# Patient Record
Sex: FEMALE | Race: BLACK OR AFRICAN AMERICAN
[De-identification: names, ages, dates, MRNs, and addresses within clinical notes are randomized per-mention and may not be internally consistent; named-entity substitution may affect disease eponyms.]

---

## 2018-03-14 ENCOUNTER — HOSPITAL ENCOUNTER (EMERGENCY)
Dept: HOSPITAL 62 - ER | Age: 58
Discharge: HOME | End: 2018-03-14
Payer: MEDICARE

## 2018-03-14 VITALS — DIASTOLIC BLOOD PRESSURE: 82 MMHG | SYSTOLIC BLOOD PRESSURE: 139 MMHG

## 2018-03-14 DIAGNOSIS — S96.911A: Primary | ICD-10-CM

## 2018-03-14 DIAGNOSIS — Z90.710: ICD-10-CM

## 2018-03-14 DIAGNOSIS — Z96.652: ICD-10-CM
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
## 2018-03-14 DIAGNOSIS — S83.91XA: ICD-10-CM

## 2018-03-14 DIAGNOSIS — E03.9: ICD-10-CM

## 2018-03-14 DIAGNOSIS — Z88.6: ICD-10-CM

## 2018-03-14 DIAGNOSIS — E78.00: ICD-10-CM

## 2018-03-14 DIAGNOSIS — I10: ICD-10-CM

## 2018-03-14 PROCEDURE — 99283 EMERGENCY DEPT VISIT LOW MDM: CPT

## 2018-03-14 NOTE — RADIOLOGY REPORT (SQ)
EXAM DESCRIPTION:  FOOT RIGHT COMPLETE



COMPLETED DATE/TIME:  3/14/2018 6:22 pm



REASON FOR STUDY:  PAIN, FALL



COMPARISON:  None.



NUMBER OF VIEWS:  Three views.



TECHNIQUE:  AP, lateral and oblique  radiographic images acquired of the right foot.



LIMITATIONS:  None.



FINDINGS:  MINERALIZATION: Normal.

BONES: No acute fracture or dislocation.  Hallux valgus and bunion deformity.  Calcaneal spurs.  No w
orrisome bone lesions.

JOINTS: No effusions.

SOFT TISSUES: No soft tissue swelling.  No foreign body.

OTHER: No other significant finding.



IMPRESSION:  CHRONIC CHANGES. NO RADIOGRAPHIC EVIDENCE OF ACUTE INJURY.



TECHNICAL DOCUMENTATION:  JOB ID:  [id number]

 2011 [employer]- All Rights Reserved



Reading location - IP/workstation name: [email or web address]

## 2018-03-14 NOTE — RADIOLOGY REPORT (SQ)
EXAM DESCRIPTION:  KNEE RIGHT 2 VIEWS



COMPLETED DATE/TIME:  3/14/2018 6:22 pm



REASON FOR STUDY:  PAIN



COMPARISON:  None.



NUMBER OF VIEWS:  Two views.



TECHNIQUE:  AP, lateral, and both oblique radiographic images acquired of the right knee.



LIMITATIONS:  None.



FINDINGS:  MINERALIZATION: Normal.

BONES: Knee prosthesis.  No acute fracture or dislocation.  No worrisome bone lesions.

JOINT: No effusion.

SOFT TISSUES: No soft tissue swelling.  No radio-opaque foreign body.

OTHER: No other significant finding.



IMPRESSION:  KNEE PROSTHESIS.  NO ACUTE FINDINGS.



TECHNICAL DOCUMENTATION:  JOB ID:  [id number]

 2011 [employer]- All Rights Reserved



Reading location - IP/workstation name: [email or web address]

## 2018-03-14 NOTE — RADIOLOGY REPORT (SQ)
EXAM DESCRIPTION:  ANKLE RIGHT COMPLETE



COMPLETED DATE/TIME:  3/14/2018 6:22 pm



REASON FOR STUDY:  PAIN, FALL



COMPARISON:  None.



NUMBER OF VIEWS:  Three views.



TECHNIQUE:  AP, lateral, and oblique radiographic images acquired of the right ankle.



LIMITATIONS:  None.



FINDINGS:  MINERALIZATION: Normal.

BONES: No acute fracture or dislocation.  Corticated structure adjacent to the medial malleolus.  No 
worrisome bone lesions.

JOINTS: No effusions.

SOFT TISSUES: No soft tissue swelling. No foreign body.

OTHER: No other significant finding.



IMPRESSION:  CORTICATED STRUCTURE ADJACENT TO THE MEDIAL MALLEOLUS, ACCESSORY OSSICLE VERSUS OLD FRAC
TURE.  NO ACUTE FINDINGS.



TECHNICAL DOCUMENTATION:  JOB ID:  [id number]

 2011 [employer]- All Rights Reserved



Reading location - IP/workstation name: [email or web address]

## 2018-03-14 NOTE — ER DOCUMENT REPORT
ED Extremity Problem, Lower





- General


Chief Complaint: Knee Injury


Stated Complaint: RT KNEE INJURY


Time Seen by Provider: 03/14/18 17:46


Mode of Arrival: Wheelchair


Information source: Patient, Relative


TRAVEL OUTSIDE OF THE [place] IN LAST 30 DAYS: No





- HPI


Patient complains to provider of: Injury, Pain


Location: Ankle, Foot, Knee


Occurred: This morning


Notes: 





Patient states that she was on a step stool hanging curtains at home this 

morning when she fell and injured her right ankle as well as her right foot and 

knee.  She denies striking her head.  She denies loss of consciousness.  She 

denies blood thinners.  She denies any headache.  She denies any nausea, 

vomiting, diarrhea.  She denies any numbness, tingling, weakness.  No redness.  

She does complain of some spasms in her legs as well.  No chest pain or 

shortness of breath.  She denies any other injuries or any other complaints.  

Pain is much worse with weightbearing and movement, better with rest.





- Related Data


Allergies/Adverse Reactions: 


 





acetaminophen [From Percocet] Allergy (Severe, Verified 03/14/18 16:28)


 Hives


oxycodone HCl [From Percocet] Allergy (Severe, Verified 03/14/18 16:28)


 Hives











Past Medical History





- Social History


Smoking Status: Unknown if Ever Smoked


Family History: Reviewed & Not Pertinent





- Past Medical History


Cardiac Medical History: Reports: Hx Hypercholesterolemia, Hx Hypertension


   Denies: Hx Coronary Artery Disease, Hx Heart Attack


Pulmonary Medical History: Reports: Hx Asthma - NOT MEDICATED


   Denies: Hx Bronchitis, Hx COPD, Hx Pneumonia


Neurological Medical History: Denies: Hx Cerebrovascular Accident, Hx Seizures


Endocrine Medical History: Reports: Hx Hypothyroidism


GI Medical History: Reports: Hx Gastroesophageal Reflux Disease


Musculoskeltal Medical History: Reports Hx Arthritis - KNEES


Psychiatric Medical History: Reports: Hx Anxiety, Hx Depression


Past Surgical History: Reports: Hx Breast Surgery - lumpectomy right breast, Hx 

Hysterectomy, Hx Orthopedic Surgery - left knee replacement,left ankle, Hx 

Tonsillectomy





- Immunizations


Immunizations up to date: Yes


Hx Diphtheria, Pertussis, Tetanus Vaccination: Yes





Review of Systems





- Review of Systems


-: Yes All other systems reviewed and negative





Physical Exam





- Vital signs


Vitals: 





 











Temp Pulse Resp BP Pulse Ox


 


 98.1 F   75   20   134/78 H  99 


 


 03/14/18 16:34  03/14/18 16:34  03/14/18 16:34  03/14/18 16:34  03/14/18 16:34














- Notes


Notes: 





GENERAL: alert, cooperative, nontoxic, no distress.


HEAD: normocephalic, atraumatic


EYES: conjunctiva pink without discharge, no external redness or swelling.


EARS: no external swelling, no external redness


NOSE: atraumatic, no external swelling


MOUTH/THROAT: mucous membranes moist and pink


NECK: soft, supple, full range of motion, no meningismus.


CHEST: no distress, lungs clear and equal throughout.  No wheezing, rales, 

rhonchi.


CARDIAC: regular rate and rhythm, no murmur, normal capillary refill, normal 

pulses.  


BACK: full range of motion, no CVA tenderness.


EXTREMITIES: full range of motion of all extremities.  No redness, no swelling.

  Mild tenderness to palpation of the right anterior knee.  No redness or 

swelling.  No obvious ligament instability.  Normal straight leg raise.  

Tenderness to palpation of the right medial and malleolus, no deformity.  

Achilles is intact with normal Gregg's test.  Pain across the entire dorsum 

of the right foot.  No deformity.  Normal pulse and sensation distally.  No 

proximal tib-fib tenderness.


NEURO: alert and oriented 3, no focal deficits, full range of motion of all 

extremities.


PYSCH: appropriate mood, affect.  Patient is cooperative.


SKIN: pink, warm, dry, no rash.








Course





- Re-evaluation


Re-evalutation: 





03/14/18 19:37


The patient is nontoxic appearing with stable vitals.  The patient fell off a 

step ladder and injured her right knee as well as her right foot and ankle.  

She has no signs of infection.  X-ray showed no acute findings of the foot, 

ankle, knee.  She has a normal neurovascular exam.  Patient will be placed in 

an Ace wrap in the right foot and ankle.  She is requesting crutches.  She will 

be discharged home with a prescription for Voltaren and Zanaflex.  She is 

instructed to rest, ice, elevate her injuries.  Follow-up with her doctor if 

not better in the next week, sooner for worsening pain, high fever, redness, 

swelling, numbness, tingling, weakness, any further concerns.





The patient is noted to have elevated blood pressure during today's emergency 

department visit.  The patient was informed of this finding.  The patient was 

instructed that this may be related to pre-hypertension and requires further 

evaluation with a primary care provider.  The patient has no hypertensive 

symptoms at this time.





The patient's emergency department workup and current diagnosis were explained 

to the patient and or family.  Follow-up instructions were provided.  

Medications if prescribed were discussed. Instructions for when to return to 

the emergency department including specific  worrisome symptoms were discussed 

with the patient and/or family.





- Vital Signs


Vital signs: 





 











Temp Pulse Resp BP Pulse Ox


 


 98.1 F   75   20   134/78 H  99 


 


 03/14/18 16:34  03/14/18 16:34  03/14/18 16:34  03/14/18 16:34  03/14/18 16:34














- Diagnostic Test


Radiology reviewed: Image reviewed, Reports reviewed - Right knee, right foot, 

right ankle with no acute findings.





Procedures





- Immobilization


  ** Right foot and ankle


Pre-Proc Neuro Vasc Exam: Normal


Immobilizer type: Ace wrap


Performed by: PCT


Post-Proc Neuro Vasc Exam: Normal


Alignment checked and good: Yes





Discharge





- Discharge


Clinical Impression: 


Strain of right ankle and foot


Qualifiers:


 Encounter type: initial encounter Qualified Code(s): S96.911A - Strain of 

unspecified muscle and tendon at ankle and foot level, right foot, initial 

encounter





Right knee sprain


Qualifiers:


 Encounter type: initial encounter Involved ligament of knee: unspecified 

ligament Qualified Code(s): S83.91XA - Sprain of unspecified site of right knee

, initial encounter





Condition: Stable


Disposition: HOME, SELF-CARE


Instructions:  Use of Crutches ([facility]), Ice & Elevation ([facility]), Sprained Knee ([facility])

, Sprained Ankle ([facility])


Additional Instructions: 


Take medications as prescribed.  Rest, ice, elevate your foot, ankle, knee.  

Use crutches as needed for pain.  Follow-up with your doctor if not better in 1 

week, sooner for worsening pain, fever, redness, numbness, tingling, weakness, 

any further concerns.





Your blood pressure was elevated during today's visit.  Have this rechecked 

with your doctor.


Prescriptions: 


Diclofenac Sodium [Voltaren 50 Mg Tablet.] 50 mg PO BID #20 tablet.


Tizanidine HCl [Zanaflex 4 Mg Tablet] 4 mg PO BID PRN #10 tablet


 PRN Reason: 


Forms:  Elevated Blood Pressure, Smoking Cessation Education


Referrals: 


[facility] [Provider Group] - Follow up as needed


DORIS MILLER MD [ACTIVE STAFF] - Follow up as needed

## 2018-04-11 ENCOUNTER — HOSPITAL ENCOUNTER (OUTPATIENT)
Dept: HOSPITAL 62 - OD | Age: 58
End: 2018-04-11
Attending: INTERNAL MEDICINE
Payer: MEDICARE

## 2018-04-11 DIAGNOSIS — I12.9: Primary | ICD-10-CM

## 2018-04-11 DIAGNOSIS — N18.3: ICD-10-CM

## 2018-04-11 LAB
ANION GAP SERPL CALC-SCNC: 11 MMOL/L (ref 5–19)
APPEARANCE UR: CLEAR
APTT PPP: YELLOW S
BILIRUB UR QL STRIP: NEGATIVE
BUN SERPL-MCNC: 30 MG/DL (ref 7–20)
CALCIUM: 9.9 MG/DL (ref 8.4–10.2)
CHLORIDE SERPL-SCNC: 106 MMOL/L (ref 98–107)
CO2 SERPL-SCNC: 28 MMOL/L (ref 22–30)
ERYTHROCYTE [DISTWIDTH] IN BLOOD BY AUTOMATED COUNT: 14.4 % (ref 11.5–14)
GLUCOSE SERPL-MCNC: 98 MG/DL (ref 75–110)
GLUCOSE UR STRIP-MCNC: NEGATIVE MG/DL
HCT VFR BLD CALC: 32.9 % (ref 36–47)
HGB BLD-MCNC: 10.7 G/DL (ref 12–15.5)
KETONES UR STRIP-MCNC: NEGATIVE MG/DL
MCH RBC QN AUTO: 30.9 PG (ref 27–33.4)
MCHC RBC AUTO-ENTMCNC: 32.4 G/DL (ref 32–36)
MCV RBC AUTO: 95 FL (ref 80–97)
NITRITE UR QL STRIP: NEGATIVE
PH UR STRIP: 6 [PH] (ref 5–9)
PLATELET # BLD: 156 10^3/UL (ref 150–450)
POTASSIUM SERPL-SCNC: 4.9 MMOL/L (ref 3.6–5)
PROT UR STRIP-MCNC: NEGATIVE MG/DL
RBC # BLD AUTO: 3.45 10^6/UL (ref 3.72–5.28)
SODIUM SERPL-SCNC: 145.2 MMOL/L (ref 137–145)
SP GR UR STRIP: 1.02
UROBILINOGEN UR-MCNC: NEGATIVE MG/DL (ref ?–2)
WBC # BLD AUTO: 3.3 10^3/UL (ref 4–10.5)

## 2018-04-11 PROCEDURE — 81001 URINALYSIS AUTO W/SCOPE: CPT

## 2018-04-11 PROCEDURE — 85027 COMPLETE CBC AUTOMATED: CPT

## 2018-04-11 PROCEDURE — 80048 BASIC METABOLIC PNL TOTAL CA: CPT

## 2018-04-11 PROCEDURE — 36415 COLL VENOUS BLD VENIPUNCTURE: CPT

## 2018-06-11 ENCOUNTER — HOSPITAL ENCOUNTER (OUTPATIENT)
Dept: HOSPITAL 62 - OD | Age: 58
End: 2018-06-11
Attending: INTERNAL MEDICINE
Payer: MEDICARE

## 2018-06-11 DIAGNOSIS — D64.9: ICD-10-CM

## 2018-06-11 DIAGNOSIS — N18.3: ICD-10-CM

## 2018-06-11 DIAGNOSIS — I12.9: Primary | ICD-10-CM

## 2018-06-11 LAB
ALBUMIN SERPL-MCNC: 4.3 G/DL (ref 3.5–5)
ALP SERPL-CCNC: 104 U/L (ref 38–126)
ALT SERPL-CCNC: 23 U/L (ref 9–52)
ANION GAP SERPL CALC-SCNC: 11 MMOL/L (ref 5–19)
AST SERPL-CCNC: 25 U/L (ref 14–36)
BILIRUB DIRECT SERPL-MCNC: 0.4 MG/DL (ref 0–0.4)
BILIRUB SERPL-MCNC: 0.4 MG/DL (ref 0.2–1.3)
BUN SERPL-MCNC: 27 MG/DL (ref 7–20)
CALCIUM: 10.1 MG/DL (ref 8.4–10.2)
CHLORIDE SERPL-SCNC: 106 MMOL/L (ref 98–107)
CO2 SERPL-SCNC: 28 MMOL/L (ref 22–30)
ERYTHROCYTE [DISTWIDTH] IN BLOOD BY AUTOMATED COUNT: 14.6 % (ref 11.5–14)
FERRITIN SERPL-MCNC: 31.8 NG/ML (ref 11.1–264)
GLUCOSE SERPL-MCNC: 87 MG/DL (ref 75–110)
HCT VFR BLD CALC: 32.7 % (ref 36–47)
HGB BLD-MCNC: 10.9 G/DL (ref 12–15.5)
IRON(TIBC): 96.4 UG/DL (ref 37–170)
MCH RBC QN AUTO: 31.1 PG (ref 27–33.4)
MCHC RBC AUTO-ENTMCNC: 33.2 G/DL (ref 32–36)
MCV RBC AUTO: 94 FL (ref 80–97)
PLATELET # BLD: 146 10^3/UL (ref 150–450)
POTASSIUM SERPL-SCNC: 4.7 MMOL/L (ref 3.6–5)
PROT SERPL-MCNC: 7.4 G/DL (ref 6.3–8.2)
RBC # BLD AUTO: 3.5 10^6/UL (ref 3.72–5.28)
SODIUM SERPL-SCNC: 144.6 MMOL/L (ref 137–145)
WBC # BLD AUTO: 3.4 10^3/UL (ref 4–10.5)

## 2018-06-11 PROCEDURE — 85027 COMPLETE CBC AUTOMATED: CPT

## 2018-06-11 PROCEDURE — 36415 COLL VENOUS BLD VENIPUNCTURE: CPT

## 2018-06-11 PROCEDURE — 83540 ASSAY OF IRON: CPT

## 2018-06-11 PROCEDURE — 84165 PROTEIN E-PHORESIS SERUM: CPT

## 2018-06-11 PROCEDURE — 82728 ASSAY OF FERRITIN: CPT

## 2018-06-11 PROCEDURE — 83550 IRON BINDING TEST: CPT

## 2018-06-11 PROCEDURE — 80053 COMPREHEN METABOLIC PANEL: CPT

## 2018-06-11 PROCEDURE — 83735 ASSAY OF MAGNESIUM: CPT

## 2018-06-11 PROCEDURE — 84443 ASSAY THYROID STIM HORMONE: CPT

## 2018-06-12 LAB
ALBUMIN/GLOB SERPL: 1.1 {RATIO} (ref 0.7–1.7)
ALPHA-2-GLOBULIN 2: 0.8 G/DL (ref 0.4–1)
BETA GLOBULINS: 1.2 G/DL (ref 0.7–1.3)
GAMMA GLOB SERPL ELPH-MCNC: 1.3 G/DL (ref 0.4–1.8)
GLOBULIN TOTAL: 3.4 G/DL (ref 2.2–3.9)
OTHER ANTIBIOTIC SUSC ISLT: 3.9 G/DL (ref 2.9–4.4)
PROT SERPL-MCNC: 7.3 G/DL (ref 6–8.5)

## 2018-09-24 ENCOUNTER — HOSPITAL ENCOUNTER (OUTPATIENT)
Dept: HOSPITAL 62 - OD | Age: 58
End: 2018-09-24
Attending: INTERNAL MEDICINE
Payer: MEDICARE

## 2018-09-24 DIAGNOSIS — I12.9: Primary | ICD-10-CM

## 2018-09-24 DIAGNOSIS — N18.3: ICD-10-CM

## 2018-09-24 LAB
ANION GAP SERPL CALC-SCNC: 9 MMOL/L (ref 5–19)
APPEARANCE UR: (no result)
APTT PPP: YELLOW S
BILIRUB UR QL STRIP: NEGATIVE
BUN SERPL-MCNC: 20 MG/DL (ref 7–20)
CALCIUM: 10 MG/DL (ref 8.4–10.2)
CHLORIDE SERPL-SCNC: 108 MMOL/L (ref 98–107)
CO2 SERPL-SCNC: 26 MMOL/L (ref 22–30)
CREAT UR-MCNC: 135.4 MG/DL (ref 15–278)
ERYTHROCYTE [DISTWIDTH] IN BLOOD BY AUTOMATED COUNT: 14.3 % (ref 11.5–14)
GLUCOSE SERPL-MCNC: 76 MG/DL (ref 75–110)
GLUCOSE UR STRIP-MCNC: NEGATIVE MG/DL
HCT VFR BLD CALC: 34.6 % (ref 36–47)
HGB BLD-MCNC: 11.6 G/DL (ref 12–15.5)
KETONES UR STRIP-MCNC: NEGATIVE MG/DL
MCH RBC QN AUTO: 31.4 PG (ref 27–33.4)
MCHC RBC AUTO-ENTMCNC: 33.5 G/DL (ref 32–36)
MCV RBC AUTO: 94 FL (ref 80–97)
NITRITE UR QL STRIP: NEGATIVE
PH UR STRIP: 5 [PH] (ref 5–9)
PLATELET # BLD: 158 10^3/UL (ref 150–450)
POTASSIUM SERPL-SCNC: 4.8 MMOL/L (ref 3.6–5)
PROT UR STRIP-MCNC: 8.4 MG/DL (ref ?–12)
PROT UR STRIP-MCNC: NEGATIVE MG/DL
RBC # BLD AUTO: 3.69 10^6/UL (ref 3.72–5.28)
SODIUM SERPL-SCNC: 142.8 MMOL/L (ref 137–145)
SP GR UR STRIP: 1.01
UR PRO/CREAT RATIO RESULT: 0.1 MG/MG (ref 0–0.2)
UROBILINOGEN UR-MCNC: NEGATIVE MG/DL (ref ?–2)
WBC # BLD AUTO: 3.7 10^3/UL (ref 4–10.5)

## 2018-09-24 PROCEDURE — 82570 ASSAY OF URINE CREATININE: CPT

## 2018-09-24 PROCEDURE — 36415 COLL VENOUS BLD VENIPUNCTURE: CPT

## 2018-09-24 PROCEDURE — 81001 URINALYSIS AUTO W/SCOPE: CPT

## 2018-09-24 PROCEDURE — 85027 COMPLETE CBC AUTOMATED: CPT

## 2018-09-24 PROCEDURE — 80048 BASIC METABOLIC PNL TOTAL CA: CPT

## 2018-09-24 PROCEDURE — 84156 ASSAY OF PROTEIN URINE: CPT

## 2018-12-27 ENCOUNTER — HOSPITAL ENCOUNTER (OUTPATIENT)
Dept: HOSPITAL 62 - OD | Age: 58
End: 2018-12-27
Attending: INTERNAL MEDICINE
Payer: MEDICARE

## 2018-12-27 DIAGNOSIS — D64.9: ICD-10-CM

## 2018-12-27 DIAGNOSIS — N18.3: Primary | ICD-10-CM

## 2018-12-27 DIAGNOSIS — I12.9: ICD-10-CM

## 2018-12-27 LAB
ANION GAP SERPL CALC-SCNC: 8 MMOL/L (ref 5–19)
BUN SERPL-MCNC: 19 MG/DL (ref 7–20)
CALCIUM: 9.7 MG/DL (ref 8.4–10.2)
CHLORIDE SERPL-SCNC: 105 MMOL/L (ref 98–107)
CK SERPL-CCNC: 197 U/L (ref 30–135)
CO2 SERPL-SCNC: 26 MMOL/L (ref 22–30)
ERYTHROCYTE [DISTWIDTH] IN BLOOD BY AUTOMATED COUNT: 16 % (ref 11.5–14)
GLUCOSE SERPL-MCNC: 86 MG/DL (ref 75–110)
HCT VFR BLD CALC: 34.6 % (ref 36–47)
HGB BLD-MCNC: 11.3 G/DL (ref 12–15.5)
MCH RBC QN AUTO: 30.4 PG (ref 27–33.4)
MCHC RBC AUTO-ENTMCNC: 32.7 G/DL (ref 32–36)
MCV RBC AUTO: 93 FL (ref 80–97)
PLATELET # BLD: 113 10^3/UL (ref 150–450)
POTASSIUM SERPL-SCNC: 4.9 MMOL/L (ref 3.6–5)
RBC # BLD AUTO: 3.71 10^6/UL (ref 3.72–5.28)
SODIUM SERPL-SCNC: 139.1 MMOL/L (ref 137–145)
WBC # BLD AUTO: 3.6 10^3/UL (ref 4–10.5)

## 2018-12-27 PROCEDURE — 85027 COMPLETE CBC AUTOMATED: CPT

## 2018-12-27 PROCEDURE — 36415 COLL VENOUS BLD VENIPUNCTURE: CPT

## 2018-12-27 PROCEDURE — 83735 ASSAY OF MAGNESIUM: CPT

## 2018-12-27 PROCEDURE — 80048 BASIC METABOLIC PNL TOTAL CA: CPT

## 2018-12-27 PROCEDURE — 82550 ASSAY OF CK (CPK): CPT

## 2019-07-01 ENCOUNTER — HOSPITAL ENCOUNTER (OUTPATIENT)
Dept: HOSPITAL 62 - OD | Age: 59
End: 2019-07-01
Attending: INTERNAL MEDICINE
Payer: MEDICARE

## 2019-07-01 DIAGNOSIS — N18.3: Primary | ICD-10-CM

## 2019-07-01 DIAGNOSIS — I12.9: ICD-10-CM

## 2019-07-01 DIAGNOSIS — D64.9: ICD-10-CM

## 2019-07-01 LAB
ANION GAP SERPL CALC-SCNC: 8 MMOL/L (ref 5–19)
APPEARANCE UR: (no result)
APTT PPP: YELLOW S
BILIRUB UR QL STRIP: NEGATIVE
BUN SERPL-MCNC: 17 MG/DL (ref 7–20)
CALCIUM: 9.9 MG/DL (ref 8.4–10.2)
CHLORIDE SERPL-SCNC: 105 MMOL/L (ref 98–107)
CO2 SERPL-SCNC: 28 MMOL/L (ref 22–30)
ERYTHROCYTE [DISTWIDTH] IN BLOOD BY AUTOMATED COUNT: 15 % (ref 11.5–14)
GLUCOSE SERPL-MCNC: 84 MG/DL (ref 75–110)
GLUCOSE UR STRIP-MCNC: NEGATIVE MG/DL
HCT VFR BLD CALC: 34.3 % (ref 36–47)
HGB BLD-MCNC: 11.4 G/DL (ref 12–15.5)
KETONES UR STRIP-MCNC: NEGATIVE MG/DL
MCH RBC QN AUTO: 31.2 PG (ref 27–33.4)
MCHC RBC AUTO-ENTMCNC: 33.2 G/DL (ref 32–36)
MCV RBC AUTO: 94 FL (ref 80–97)
NITRITE UR QL STRIP: NEGATIVE
PH UR STRIP: 5 [PH] (ref 5–9)
PLATELET # BLD: 150 10^3/UL (ref 150–450)
POTASSIUM SERPL-SCNC: 4.7 MMOL/L (ref 3.6–5)
PROT UR STRIP-MCNC: NEGATIVE MG/DL
RBC # BLD AUTO: 3.64 10^6/UL (ref 3.72–5.28)
SODIUM SERPL-SCNC: 140.9 MMOL/L (ref 137–145)
SP GR UR STRIP: 1.02
UROBILINOGEN UR-MCNC: NEGATIVE MG/DL (ref ?–2)
WBC # BLD AUTO: 3.2 10^3/UL (ref 4–10.5)

## 2019-07-01 PROCEDURE — 85027 COMPLETE CBC AUTOMATED: CPT

## 2019-07-01 PROCEDURE — 36415 COLL VENOUS BLD VENIPUNCTURE: CPT

## 2019-07-01 PROCEDURE — 80048 BASIC METABOLIC PNL TOTAL CA: CPT

## 2019-07-01 PROCEDURE — 81001 URINALYSIS AUTO W/SCOPE: CPT
